# Patient Record
Sex: FEMALE | Race: WHITE
[De-identification: names, ages, dates, MRNs, and addresses within clinical notes are randomized per-mention and may not be internally consistent; named-entity substitution may affect disease eponyms.]

---

## 2018-01-05 ENCOUNTER — HOSPITAL ENCOUNTER (EMERGENCY)
Dept: HOSPITAL 17 - NEPD | Age: 23
Discharge: HOME | End: 2018-01-05
Payer: MEDICAID

## 2018-01-05 VITALS
HEART RATE: 86 BPM | RESPIRATION RATE: 14 BRPM | DIASTOLIC BLOOD PRESSURE: 69 MMHG | SYSTOLIC BLOOD PRESSURE: 135 MMHG | OXYGEN SATURATION: 94 %

## 2018-01-05 VITALS — BODY MASS INDEX: 22.86 KG/M2 | HEIGHT: 69 IN | WEIGHT: 154.32 LBS

## 2018-01-05 VITALS
SYSTOLIC BLOOD PRESSURE: 133 MMHG | HEART RATE: 131 BPM | RESPIRATION RATE: 16 BRPM | TEMPERATURE: 98.7 F | OXYGEN SATURATION: 97 % | DIASTOLIC BLOOD PRESSURE: 79 MMHG

## 2018-01-05 VITALS
OXYGEN SATURATION: 99 % | DIASTOLIC BLOOD PRESSURE: 97 MMHG | RESPIRATION RATE: 20 BRPM | SYSTOLIC BLOOD PRESSURE: 157 MMHG | TEMPERATURE: 98.5 F | HEART RATE: 122 BPM

## 2018-01-05 DIAGNOSIS — S71.112A: ICD-10-CM

## 2018-01-05 DIAGNOSIS — F17.200: ICD-10-CM
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
## 2018-01-05 DIAGNOSIS — R82.71: ICD-10-CM

## 2018-01-05 DIAGNOSIS — F43.25: Primary | ICD-10-CM

## 2018-01-05 DIAGNOSIS — F10.129: ICD-10-CM
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
## 2018-01-05 LAB
BACTERIA #/AREA URNS HPF: (no result) /HPF
BASOPHILS # BLD AUTO: 0.1 TH/MM3 (ref 0–0.2)
BASOPHILS NFR BLD: 1.4 % (ref 0–2)
BUN SERPL-MCNC: 3 MG/DL (ref 7–18)
CALCIUM SERPL-MCNC: 8.6 MG/DL (ref 8.5–10.1)
CHLORIDE SERPL-SCNC: 108 MEQ/L (ref 98–107)
COLOR UR: (no result)
CREAT SERPL-MCNC: 0.53 MG/DL (ref 0.5–1)
EOSINOPHIL # BLD: 0.4 TH/MM3 (ref 0–0.4)
EOSINOPHIL NFR BLD: 3.9 % (ref 0–4)
ERYTHROCYTE [DISTWIDTH] IN BLOOD BY AUTOMATED COUNT: 14.1 % (ref 11.6–17.2)
GFR SERPLBLD BASED ON 1.73 SQ M-ARVRAT: 144 ML/MIN (ref 89–?)
GLUCOSE SERPL-MCNC: 110 MG/DL (ref 74–106)
GLUCOSE UR STRIP-MCNC: (no result) MG/DL
HCO3 BLD-SCNC: 21.1 MEQ/L (ref 21–32)
HCT VFR BLD CALC: 56.6 % (ref 35–46)
HGB BLD-MCNC: 19.6 GM/DL (ref 11.6–15.3)
HGB UR QL STRIP: (no result)
KETONES UR STRIP-MCNC: (no result) MG/DL
LYMPHOCYTES # BLD AUTO: 2.5 TH/MM3 (ref 1–4.8)
LYMPHOCYTES NFR BLD AUTO: 26.1 % (ref 9–44)
MCH RBC QN AUTO: 34.5 PG (ref 27–34)
MCHC RBC AUTO-ENTMCNC: 34.6 % (ref 32–36)
MCV RBC AUTO: 99.5 FL (ref 80–100)
MONOCYTE #: 1 TH/MM3 (ref 0–0.9)
MONOCYTES NFR BLD: 10.9 % (ref 0–8)
NEUTROPHILS # BLD AUTO: 5.6 TH/MM3 (ref 1.8–7.7)
NEUTROPHILS NFR BLD AUTO: 57.7 % (ref 16–70)
NITRITE UR QL STRIP: (no result)
PLATELET # BLD: 250 TH/MM3 (ref 150–450)
PMV BLD AUTO: 6.5 FL (ref 7–11)
RBC # BLD AUTO: 5.69 MIL/MM3 (ref 4–5.3)
SODIUM SERPL-SCNC: 141 MEQ/L (ref 136–145)
SP GR UR STRIP: 1 (ref 1–1.03)
SQUAMOUS #/AREA URNS HPF: <1 /HPF (ref 0–5)
URINE LEUKOCYTE ESTERASE: (no result)
WBC # BLD AUTO: 9.6 TH/MM3 (ref 4–11)

## 2018-01-05 PROCEDURE — 85025 COMPLETE CBC W/AUTO DIFF WBC: CPT

## 2018-01-05 PROCEDURE — 87186 SC STD MICRODIL/AGAR DIL: CPT

## 2018-01-05 PROCEDURE — 87077 CULTURE AEROBIC IDENTIFY: CPT

## 2018-01-05 PROCEDURE — 80307 DRUG TEST PRSMV CHEM ANLYZR: CPT

## 2018-01-05 PROCEDURE — 96372 THER/PROPH/DIAG INJ SC/IM: CPT

## 2018-01-05 PROCEDURE — 81001 URINALYSIS AUTO W/SCOPE: CPT

## 2018-01-05 PROCEDURE — 84703 CHORIONIC GONADOTROPIN ASSAY: CPT

## 2018-01-05 PROCEDURE — 80048 BASIC METABOLIC PNL TOTAL CA: CPT

## 2018-01-05 PROCEDURE — 99284 EMERGENCY DEPT VISIT MOD MDM: CPT

## 2018-01-05 PROCEDURE — 87086 URINE CULTURE/COLONY COUNT: CPT
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
## 2018-01-05 NOTE — PD
Physical Exam


Time Seen by Provider:  08:33


Narrative


I reviewed the labs for clearance for psychiatric evaluation.





Data


Data


Last Documented VS





Vital Signs








  Date Time  Temp Pulse Resp B/P (MAP) Pulse Ox O2 Delivery O2 Flow Rate FiO2


 


1/5/18 11:59 98.7 131 16 133/79 (97) 97   


 


1/5/18 08:41      Room Air  








Orders





 Orders


Complete Blood Count With Diff (1/5/18 05:44)


Basic Metabolic Panel (Bmp) (1/5/18 05:44)


Psych Screen (1/5/18 05:44)


Drug Screen, Random Urine (1/5/18 05:44)


Alcohol (Ethanol) (1/5/18 05:44)


Lorazepam Inj (Ativan Inj) (1/5/18 06:00)


Wound Care (1/5/18 05:55)


Urinalysis - C+S If Indicated (1/5/18 06:17)


Ed Urine Pregnancytest Poc (1/5/18 06:17)


Diet Regular Basic (1/5/18 Breakfast)


Urine Culture (1/5/18 06:15)


Diet Regular Basic (1/5/18 Lunch)


Diet Regular Basic (1/5/18 Dinner)





Labs





Laboratory Tests








Test


  1/5/18


06:15


 


White Blood Count 9.6 TH/MM3 


 


Red Blood Count 5.69 MIL/MM3 


 


Hemoglobin 19.6 GM/DL 


 


Hematocrit 56.6 % 


 


Mean Corpuscular Volume 99.5 FL 


 


Mean Corpuscular Hemoglobin 34.5 PG 


 


Mean Corpuscular Hemoglobin


Concent 34.6 % 


 


 


Red Cell Distribution Width 14.1 % 


 


Platelet Count 250 TH/MM3 


 


Mean Platelet Volume 6.5 FL 


 


Neutrophils (%) (Auto) 57.7 % 


 


Lymphocytes (%) (Auto) 26.1 % 


 


Monocytes (%) (Auto) 10.9 % 


 


Eosinophils (%) (Auto) 3.9 % 


 


Basophils (%) (Auto) 1.4 % 


 


Neutrophils # (Auto) 5.6 TH/MM3 


 


Lymphocytes # (Auto) 2.5 TH/MM3 


 


Monocytes # (Auto) 1.0 TH/MM3 


 


Eosinophils # (Auto) 0.4 TH/MM3 


 


Basophils # (Auto) 0.1 TH/MM3 


 


CBC Comment DIFF FINAL 


 


Differential Comment  


 


Urine Color LIGHT-YELLOW 


 


Urine Turbidity CLEAR 


 


Urine pH 6.0 


 


Urine Specific Gravity 1.003 


 


Urine Protein 30 mg/dL 


 


Urine Glucose (UA) NEG mg/dL 


 


Urine Ketones NEG mg/dL 


 


Urine Occult Blood TRACE 


 


Urine Nitrite NEG 


 


Urine Bilirubin NEG 


 


Urine Urobilinogen


  LESS THAN 2.0


MG/DL


 


Urine Leukocyte Esterase MOD 


 


Urine RBC 1 /hpf 


 


Urine WBC 3 /hpf 


 


Urine Squamous Epithelial


Cells <1 /hpf 


 


 


Urine Bacteria MOD /hpf 


 


Microscopic Urinalysis Comment


  CULTURE


INDICATED


 


Blood Urea Nitrogen 3 MG/DL 


 


Creatinine 0.53 MG/DL 


 


Random Glucose 110 MG/DL 


 


Calcium Level 8.6 MG/DL 


 


Sodium Level 141 MEQ/L 


 


Potassium Level 3.4 MEQ/L 


 


Chloride Level 108 MEQ/L 


 


Carbon Dioxide Level 21.1 MEQ/L 


 


Anion Gap 12 MEQ/L 


 


Estimat Glomerular Filtration


Rate 144 ML/MIN 


 


 


Urine Opiates Screen NEG 


 


Urine Barbiturates Screen NEG 


 


Urine Amphetamines Screen NEG 


 


Urine Benzodiazepines Screen NEG 


 


Urine Cocaine Screen NEG 


 


Urine Cannabinoids Screen NEG 


 


Ethyl Alcohol Level 341 MG/DL 











MDM


Supervised Visit with TYLER:  No


Narrative Course


I reviewed the labs for clearance for psychiatric evaluation.  Hemoglobin 19.6, 

otherwise CBC unremarkable.  BMP unremarkable.  EtOH 341.  Tox screen negative.

  Urinalysis reflex culture; positive leukocyte esterase and bacteria; WBC 3.  

Will wait for urine culture to result to treat with antibiotics if indicated.  

Instructed patient to follow-up outpatient in regards to high hemoglobin.  

Patient is medically cleared for psychiatric evaluation.


1401:  KEN Chaves has evaluated the patient, lifted Aquilino seay and cleared the 

patient for discharge.  Patient contracts safety.  Denies suicidal or homicidal 

ideations.  Patient will be provided community resource packet to [facility] for 

follow-up.  Has friends and family for support.  Patient was medically cleared 

prior to psych screening.  Patient has been evaluated by psychiatry and and is 

now cleared for discharge.


Diagnosis





 Primary Impression:  


 Adjustment reaction


 Qualified Codes:  F43.25 - Adjustment disorder with mixed disturbance of 

emotions and conduct


 Additional Impression:  


 Lacerations of multiple sites of left leg


 Qualified Codes:  S81.812A - Laceration without foreign body, left lower leg, 

initial encounter; S86.922A - Laceration of unspecified muscle(s) and tendon(s) 

at lower leg level, left leg, initial encounter


Referrals:  


[facility] (Out patient)





[facility]





Primary Care Physician





Psychiatrist





[facility]


Patient Instructions:  Abuse of Alcohol (ED), Acute Wound Care (DC), General 

Instructions, Laceration Without Closure (ED), Mood Disorders (ED)





***Additional Instruction:  


Contract safety to your self and others


.  Drinking alcohol


Follow-up with psychiatry


Follow-up with primary care provider


Follow-up with Carlos A Green/SHANIA


Return to the emergency department immediately with worsening of symptoms


***Med/Other Pt SpecificInfo:  No Change to Meds, No Meds Exist/No RX given


Scripts


No Active Prescriptions or Reported Meds


Disposition:  01 DISCHARGE HOME


Condition:  Stable











Alana Allan Jan 5, 2018 08:37
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
